# Patient Record
Sex: FEMALE | Race: WHITE | Employment: STUDENT | URBAN - METROPOLITAN AREA
[De-identification: names, ages, dates, MRNs, and addresses within clinical notes are randomized per-mention and may not be internally consistent; named-entity substitution may affect disease eponyms.]

---
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
## 2023-06-09 ENCOUNTER — TELEPHONE (OUTPATIENT)
Dept: ORTHOPEDIC SURGERY | Age: 18
End: 2023-06-09

## 2023-06-09 NOTE — TELEPHONE ENCOUNTER
Patient scheduled appointment online. Unable to reach patient to move to new spot. Ok to remain in this spot?

## 2023-06-12 NOTE — TELEPHONE ENCOUNTER
She is scheduled with Dr. Parish Enriquez for the same issue on 6/15/2023. It would be best for her to be scheduled in an open NP appointment.